# Patient Record
Sex: MALE | Race: ASIAN
[De-identification: names, ages, dates, MRNs, and addresses within clinical notes are randomized per-mention and may not be internally consistent; named-entity substitution may affect disease eponyms.]

---

## 2017-04-14 ENCOUNTER — HOSPITAL ENCOUNTER (OUTPATIENT)
Dept: HOSPITAL 74 - JASU-ENDO | Age: 62
Discharge: HOME | End: 2017-04-14
Attending: INTERNAL MEDICINE
Payer: COMMERCIAL

## 2017-04-14 VITALS — BODY MASS INDEX: 28.4 KG/M2

## 2017-04-14 VITALS — SYSTOLIC BLOOD PRESSURE: 109 MMHG | HEART RATE: 56 BPM | DIASTOLIC BLOOD PRESSURE: 55 MMHG

## 2017-04-14 VITALS — TEMPERATURE: 97.5 F

## 2017-04-14 DIAGNOSIS — D12.3: ICD-10-CM

## 2017-04-14 DIAGNOSIS — Z12.11: Primary | ICD-10-CM

## 2017-04-14 DIAGNOSIS — K57.30: ICD-10-CM

## 2017-04-14 PROCEDURE — 0DBL8ZX EXCISION OF TRANSVERSE COLON, VIA NATURAL OR ARTIFICIAL OPENING ENDOSCOPIC, DIAGNOSTIC: ICD-10-PCS | Performed by: INTERNAL MEDICINE

## 2018-06-27 ENCOUNTER — HOSPITAL ENCOUNTER (OUTPATIENT)
Dept: HOSPITAL 74 - JASU-ENDO | Age: 63
Discharge: HOME | End: 2018-06-27
Attending: INTERNAL MEDICINE
Payer: COMMERCIAL

## 2018-06-27 VITALS — SYSTOLIC BLOOD PRESSURE: 118 MMHG | HEART RATE: 57 BPM | DIASTOLIC BLOOD PRESSURE: 65 MMHG

## 2018-06-27 VITALS — BODY MASS INDEX: 28.4 KG/M2

## 2018-06-27 VITALS — TEMPERATURE: 98 F

## 2018-06-27 DIAGNOSIS — K29.40: ICD-10-CM

## 2018-06-27 DIAGNOSIS — K44.9: ICD-10-CM

## 2018-06-27 DIAGNOSIS — K22.2: ICD-10-CM

## 2018-06-27 DIAGNOSIS — K21.9: Primary | ICD-10-CM

## 2018-06-27 PROCEDURE — 0DB28ZX EXCISION OF MIDDLE ESOPHAGUS, VIA NATURAL OR ARTIFICIAL OPENING ENDOSCOPIC, DIAGNOSTIC: ICD-10-PCS | Performed by: INTERNAL MEDICINE

## 2018-06-27 PROCEDURE — 0DB98ZX EXCISION OF DUODENUM, VIA NATURAL OR ARTIFICIAL OPENING ENDOSCOPIC, DIAGNOSTIC: ICD-10-PCS | Performed by: INTERNAL MEDICINE

## 2018-06-27 PROCEDURE — 0DB48ZX EXCISION OF ESOPHAGOGASTRIC JUNCTION, VIA NATURAL OR ARTIFICIAL OPENING ENDOSCOPIC, DIAGNOSTIC: ICD-10-PCS | Performed by: INTERNAL MEDICINE

## 2018-06-27 PROCEDURE — 0DB38ZX EXCISION OF LOWER ESOPHAGUS, VIA NATURAL OR ARTIFICIAL OPENING ENDOSCOPIC, DIAGNOSTIC: ICD-10-PCS | Performed by: INTERNAL MEDICINE

## 2018-06-27 PROCEDURE — 0D748ZZ DILATION OF ESOPHAGOGASTRIC JUNCTION, VIA NATURAL OR ARTIFICIAL OPENING ENDOSCOPIC: ICD-10-PCS | Performed by: INTERNAL MEDICINE

## 2018-06-27 PROCEDURE — 0DB68ZX EXCISION OF STOMACH, VIA NATURAL OR ARTIFICIAL OPENING ENDOSCOPIC, DIAGNOSTIC: ICD-10-PCS | Performed by: INTERNAL MEDICINE

## 2018-06-28 NOTE — PATH
Surgical Pathology Report



Patient Name:  MARIAM CLARK

Accession #:  [id number]

[facility]. Rec. #:  [id number]                                                        

   /Age/Gender:  1955 (Age: 62) / M

Account:  [id number]                                                          

             Location: ASU-ENDOSCOPY

Taken:  2018

Received:  2018

Reported:  2018

Physicians:  Jaleel Castillo M.D.

  



Specimen(s) Received

A: BX 2ND PORTION DUODENUM AND BULB 

B: BX STOMACH 

C: BX SCHATZKI RING 

D: BX MID ESOPHAGUS 





Clinical History

GERD, dysphagia

Postoperative diagnosis: Schatzki's ring, hiatal hernia, GERD, atrophic

gastritis







Final Diagnosis

A. SECOND PORTION DUODENUM AND BULB, BIOPSY:

DUODENAL MUCOSA WITH ACTIVE CHRONIC DUODENITIS.



B. STOMACH, BIOPSY:

GASTRIC MUCOSA WITH ACTIVE CHRONIC GASTRITIS. 

IMMUNOSTAIN IS POSITIVE FOR H. PYLORI ORGANISMS. 



C. SCHATZKI'S RING, BIOPSY:

GASTROESOPHAGEAL JUNCTIONAL MUCOSA WITH ACTIVE CHRONIC INFLAMMATION, IN A

BACKGROUND OF REFLUX TYPE CHANGES.

NEGATIVE FOR INTESTINAL METAPLASIA.



D. MID ESOPHAGUS, BIOPSY:

ESOPHAGEAL (SQUAMOUS) MUCOSA WITH NO DIAGNOSTIC ABNORMALITIES.









***Electronically Signed***

Elena Moser M.D.





Gross Description

A.  Received in formalin, labeled "second portion of duodenum and bulb" are 3

tan, irregular portions of soft tissue ranging from 0.3-0.5 cm. in greatest

dimension. The specimens are submitted in toto in one cassette.



B.  Received in formalin, labeled "biopsy stomach" are 4 tan, irregular portions

of soft tissue ranging from 0.2-0.5 cm. in greatest dimension. The specimens are

submitted in toto in one cassette.



C.  Received in formalin, labeled "biopsy Schatzki's ring" are 4 tan, irregular

portions of soft tissue ranging from 0.3-0.5 cm. in greatest dimension. The

specimens are submitted in toto in one cassette.



D.  Received in formalin, labeled "biopsy midesophagus" are 2 tan, irregular

portions of soft tissue averaging 0.3 cm. in greatest dimension. The specimens

are submitted in toto in one cassette.

/2018



saudi

## 2018-10-11 ENCOUNTER — HOSPITAL ENCOUNTER (OUTPATIENT)
Dept: HOSPITAL 74 - JER | Age: 63
LOS: 2 days | Discharge: HOME | End: 2018-10-13
Payer: COMMERCIAL

## 2018-10-11 VITALS — BODY MASS INDEX: 27.8 KG/M2

## 2018-10-11 DIAGNOSIS — K35.80: Primary | ICD-10-CM

## 2018-10-11 DIAGNOSIS — K42.9: ICD-10-CM

## 2018-10-11 LAB
ALBUMIN SERPL-MCNC: 3.7 G/DL (ref 3.4–5)
ALP SERPL-CCNC: 114 U/L (ref 45–117)
ALT SERPL-CCNC: 57 U/L (ref 13–61)
ANION GAP SERPL CALC-SCNC: 7 MMOL/L (ref 8–16)
AST SERPL-CCNC: 25 U/L (ref 15–37)
BASOPHILS # BLD: 0.2 % (ref 0–2)
BILIRUB SERPL-MCNC: 0.4 MG/DL (ref 0.2–1)
BUN SERPL-MCNC: 16 MG/DL (ref 7–18)
CALCIUM SERPL-MCNC: 8.9 MG/DL (ref 8.5–10.1)
CHLORIDE SERPL-SCNC: 110 MMOL/L (ref 98–107)
CO2 SERPL-SCNC: 24 MMOL/L (ref 21–32)
CREAT SERPL-MCNC: 0.8 MG/DL (ref 0.55–1.3)
DEPRECATED RDW RBC AUTO: 13.3 % (ref 11.9–15.9)
EOSINOPHIL # BLD: 0.3 % (ref 0–4.5)
GLUCOSE SERPL-MCNC: 132 MG/DL (ref 74–106)
HCT VFR BLD CALC: 44.2 % (ref 35.4–49)
HGB BLD-MCNC: 14.6 GM/DL (ref 11.7–16.9)
LIPASE SERPL-CCNC: 138 U/L (ref 73–393)
LYMPHOCYTES # BLD: 6.7 % (ref 8–40)
MCH RBC QN AUTO: 30 PG (ref 25.7–33.7)
MCHC RBC AUTO-ENTMCNC: 33.1 G/DL (ref 32–35.9)
MCV RBC: 90.7 FL (ref 80–96)
MONOCYTES # BLD AUTO: 6.1 % (ref 3.8–10.2)
NEUTROPHILS # BLD: 86.7 % (ref 42.8–82.8)
PLATELET # BLD AUTO: 241 K/MM3 (ref 134–434)
PMV BLD: 7.8 FL (ref 7.5–11.1)
POTASSIUM SERPLBLD-SCNC: 4.3 MMOL/L (ref 3.5–5.1)
PROT SERPL-MCNC: 7.2 G/DL (ref 6.4–8.2)
RBC # BLD AUTO: 4.87 M/MM3 (ref 4–5.6)
SODIUM SERPL-SCNC: 141 MMOL/L (ref 136–145)
WBC # BLD AUTO: 14.6 K/MM3 (ref 4–10)

## 2018-10-11 NOTE — PDOC
Attending Attestation





- HPI


HPI: 





10/11/18 22:01


The patient is a 63 year old male with a significant past medical history of 

chronic abdominal pain who presents to the ED for abdominal pain for several 

days. Patient states he developed periumbilical abdominal pain last Friday (10/5

/18). He states his abdominal pain worsened yesterday and is now in his lower 

quadrant, right greater than left. Patient saw Dr. Frederick yesterday and had an 

US R/O appendicitis that was negative. Patient states he takes motrin for pain 

with slight relief. 


Denies fever or chills. Denies nausea, vomiting, or diarrhea. Denies dysuria or 

change in urinary output. Denies chest pain or shortness of breath. Denies any 

other symptoms. 








Documentation prepared by Rafaela Powell, acting as medical scribe for Judi Don DO





- Physicial Exam


PE: 





10/11/18 22:02





Constitutional: Awake, alert, oriented.  No acute distress.


Head:  Normocephalic.  Atraumatic


Eyes:  PERRL. EOMI.  Conjunctivae are not pale.


ENT:  Mucous membranes are moist and intact. Posterior pharynx without exudates 

or erythema. Uvula midline.


Neck:  Supple.  Full ROM. No lymphadenopathy.


Cardiovascular:  Regular rate.  Regular rhythm. S1, S2 regular.  Distal pulses 

are 2+ and symmetric.  


Pulmonary/Chest:  No evidence of respiratory distress.  Clear to auscultation 

bilaterally  No wheezing, rales or rhonchi.


Abdominal: + Diffusely tender.  Soft and non-distended.  No rebound, guarding 

or rigidity.  No organomegaly. No palpable masses. Good bowel sounds.


Back:  No CVA tenderness.


Musculoskeletal:  No edema.  No cyanosis.  No clubbing.  Full range of motion 

in all extremities.  Nocalf tenderness. Radial/pedal pulses are intact and 2+ 

bilaterally


Skin:  Skin is warm and dry.  No petechiae.  No purpura.  


Neurological:  Alert and oriented to person, place, and time.  Cranial nerves II

-XII are grossly intact. Normal speech. Strength is grossly symmetric. No 

sensory deficits.


Psychiatric:  Good eye contact.  Normal interaction, affect and behavior.











<Rafaela Powell - Last Filed: 10/11/18 22:01>





- Resident


Resident Name: Nadege Reyes





- ED Attending Attestation


I have performed the following: I have examined & evaluated the patient, The 

case was reviewed & discussed with the resident, I agree w/resident's findings 

& plan, Exceptions are as noted





- Medical Decision Making





10/11/18 22:00








I, Dr. Judi Don, DO, attest that this document has been prepared under 

my direction and personally reviewed by me in its entirety.   I further attest, 

that it accurately reflects all work, treatment, procedures and medical decision

-making performed by me.  


10/11/18 22:22


a/p: 62yo male with diffuse abd pain


-started in lower abd over the weekend, decreased po intake


-saw dr. frederick who performed labs and ultrasound as outpt


-still with nausea today and pain


-will repeat labs, will send for ct abd/pelvis 


-pt with mild diffuse ttp


-no rebound or guarding


-pt is nontoxic in appearance


-will medicate for nausea, will hydrate


-last BM was today


10/11/18 22:25


wbc from 6 to 14 


pelvic ultrasound negative for appy yesterday


10/11/18 23:39


pt with appendicitis on ct





10/11/18 23:39


case discussed with Dr. Lira who accepts pt to service - to [facility] for OR 

tomorrow





<Judi Don - Last Filed: 10/12/18 00:32>





**Heart Score/ECG Review





- ECG Intrepretation


Comment:: 





10/12/18 00:32


sinus at 63, L axis, nl interval, t wave inversions V4, t wave flattening 

diffusely





<Judi Don - Last Filed: 10/12/18 00:32>

## 2018-10-11 NOTE — PDOC
History of Present Illness





- General


Chief Complaint: Pain


Stated Complaint: PAIN, ACUTE


Time Seen by Provider: 10/11/18 20:32


History Source: Patient


Exam Limitations: No Limitations





- History of Present Illness


Initial Comments: 





10/11/18 22:07


Pt is a 62yo m with no significant PMH presenting to ED with complaints of 

abdominal pain. Pt said he has been having abdominal pain for about 1 week 

which started last Friday. Pain was periumbilical, more of a discomfort than 

pain and went away 2 days ago on Tuesday. He went to his PCP yesterday and had 

labs and abdominal ultrasound done which was negative for appendicitis. Pt 

states that the discomfort started again today and associated with nausea. He 

made himself vomit and he felt better. The pain is 3/10, described as a 

discomfort is now in his lower abdomen RLQ>LLQ. Denies fever, chest pain, sob, 

headache, diarrhea, blood in stool, urinary symptoms. He had an endoscopy done 

4 months ago in June. 





PCP: Otoniel


GE: Anna


PMH: none


PSH: none


Meds: none


Allergies: none


Social: occasional alcohol use





10/12/18 03:27


Family phone numbers:


Reynaldo (daughter) [phone]


Fang (wife) [phone]





Past History





- Past Medical History


Allergies/Adverse Reactions: 


 Allergies











Allergy/AdvReac Type Severity Reaction Status Date / Time


 


No Known Allergies Allergy   Verified 10/11/18 20:21











Home Medications: 


Ambulatory Orders





Aspirin [ASA -] 81 mg PO DAILY 04/13/17 


Multivitamin/Iron/Folic Acid [One Daily Multivitamin-Iron Tb] 1 each PO DAILY 04 /13/17 


Omega-3 Fatty Acids [Fish Oil] 300 mg PO DAILY 04/13/17 


Mag Carb/Aluminum Hydrox/Algin [Gaviscon Liquid] 15 - 30 ml PO Q6H PRN 06/26/18 


Pantoprazole Sodium [Protonix] 40 mg PO DAILY #1 tablet. 06/27/18 








Anemia: No


Asthma: No


Cancer: No


Cardiac Disorders: No


CVA: No


COPD: No


CHF: No


Dementia: No


Diabetes: No


GI Disorders: Yes (DIVERTICULOSIS, ADENOMA)


 Disorders: No


HTN: No


Hypercholesterolemia: No


Liver Disease: No


Seizures: No


Thyroid Disease: No





- Surgical History


Abdominal Surgery: No


Appendectomy: No


Cardiac Surgery: No


Cholecystectomy: No


Lung Surgery: No


Neurologic Surgery: No


Orthopedic Surgery: No





- Suicide/Smoking/Psychosocial Hx


Smoking History: Former smoker


Have you smoked in the past 12 months: No


Number of Cigarettes Smoked Daily: 2


If you are a former smoker, when did you quit?: 2017


Information on smoking cessation initiated: No


Hx Alcohol Use: Yes (OCCASIONAL)


Drug/Substance Use Hx: No


Substance Use Type: None


Hx Substance Use Treatment: No





**Review of Systems





- Review of Systems


Constitutional: No: Chills, Fever, Unexplained wgt Loss


HEENTM: No: Recent change in vision


Respiratory: No: Cough, Shortness of Breath


Cardiac (ROS): No: Chest Pain, Lightheadedness, Palpitations, Syncope


ABD/GI: Yes: See HPI, Nausea, Other (abdominal discomfort).  No: Abdominal 

Distended, Blood Streaked Bowels, Constipated, Diarrhea, Poor Appetite, Poor 

Fluid Intake, Rectal Bleeding, Vomiting, Abdominal cramping, Tarry Stools


: No: Burning, Dysuria, Hematuria


Musculoskeletal: No: Back Pain, Joint Pain, Muscle Weakness


Neurological: No: Headache, Numbness, Paresthesia, Tingling, Tremors





*Physical Exam





- Vital Signs


 Last Vital Signs











Temp Pulse Resp BP Pulse Ox


 


 98.7 F   91 H  18   152/78   98 


 


 10/11/18 20:18  10/11/18 20:18  10/11/18 20:18  10/11/18 20:18  10/11/18 20:18














- Physical Exam


Comments: 





10/12/18 06:45


pt sitting in bed comfortably with family at bedside


General Appearance: Yes: Nourished, Appropriately Dressed.  No: Apparent 

Distress


HEENT: positive: EOMI, PERCY, Normal Voice, Pharynx Normal, Hearing Grossly 

Normal.  negative: Pale Conjunctivae, Scleral Icterus (R), Scleral Icterus (L)


Neck: positive: Trachea midline, Supple.  negative: Carotid bruit, 

Lymphadenopathy (R), Lymphadenopathy (L)


Respiratory/Chest: positive: Lungs Clear, Normal Breath Sounds.  negative: 

Crackles, Rales, Rhonchi, Stridor, Wheezing


Cardiovascular: positive: Regular Rhythm, Regular Rate, S1, S2.  negative: Edema

, JVD, Murmur


Vascular Pulses: Carotid (R): 2+, Carotid (L): 2+, Dorsalis-Pedis (R): 2+, 

Doralis-Pedis (L): 2+


Gastrointestinal/Abdominal: positive: Normal Bowel Sounds, Soft.  negative: 

Distended, Guarding, Rebound, Tenderness (expressed discomfort when RLQ 

palpated however pt did not express pain)


Musculoskeletal: negative: CVA Tenderness


Extremity: positive: Normal Capillary Refill


Integumentary: positive: Normal Color, Dry, Warm


Neurologic: positive: CNs II-XII NML intact, Fully Oriented, Alert, Normal Mood/

Affect, Normal Response, Motor Strength 5/5





ED Treatment Course





- LABORATORY


CBC & Chemistry Diagram: 


 10/11/18 21:16





 10/11/18 21:16





- ADDITIONAL ORDERS


Additional order review: 


 Laboratory  Results











  10/11/18





  21:16


 


WBC  14.6 H


 


RBC  4.87


 


Hgb  14.6


 


Hct  44.2


 


MCV  90.7


 


MCH  30.0


 


MCHC  33.1


 


RDW  13.3


 


Plt Count  241


 


MPV  7.8


 


Absolute Neuts (auto)  12.7 H


 


Neutrophils %  86.7 H D


 


Lymphocytes %  6.7 L D


 


Monocytes %  6.1


 


Eosinophils %  0.3  D


 


Basophils %  0.2


 


Nucleated RBC %  0








 











  10/11/18





  21:16


 


RBC  4.87


 


MCV  90.7


 


MCHC  33.1


 


RDW  13.3


 


MPV  7.8


 


Neutrophils %  86.7 H D


 


Lymphocytes %  6.7 L D


 


Monocytes %  6.1


 


Eosinophils %  0.3  D


 


Basophils %  0.2














- RADIOLOGY


Radiology Studies Ordered: 














 Category Date Time Status


 


 ABDOMEN & PELVIS CT WITH CONTR [CT] Stat CT Scan  10/11/18 20:59 Ordered














- Medications


Given in the ED: 


ED Medications














Discontinued Medications














Generic Name Dose Route Start Last Admin





  Trade Name Freq  PRN Reason Stop Dose Admin


 


Sodium Chloride  1,000 mls @ 1,000 mls/hr  10/11/18 21:00  10/11/18 21:43





  Normal Saline -  IV  10/11/18 21:59  1,000 mls/hr





  ASDIR STA   Administration





     





     





     





     














Medical Decision Making





- Medical Decision Making


62yo m with no significant PMH presenting to ED with abdominal discomfort RLQ>

LLQ that started over the weekend. 


   Vitals: wnl


   PE: slight RLQ tenderness otherwise benign


DDx: low suspicion for appendicitis. Will order CT scan for abdominal 

pathology. Considering appendicitis, cholecystitis, colitis, gastritis, 

pancreatitis, aaa


-Orderd cbc, cmp, lipase, CT





10/11/18 22:06


spike in wbc 14. on 10/10 WBC 6.6. other labs wnl. UA negative





Received call from CT saying postive for appendicitis. Dr. oDn consulted 

Dr. Lira. Started pt on maintenance fluids. Ordered t+s, aptt, ekg.  NPO order


EKG nsr. Pt admitted to Dr. Lira and will be sent to satellite. 





Pt and family understood diagnosis and steps to follow.





*DC/Admit/Observation/Transfer


Diagnosis at time of Disposition: 


Appendicitis


Qualifiers:


 Appendicitis type: acute appendicitis Acute appendicitis type: unspecified 

acute appendicitis type Qualified Code(s): K35.80 - Unspecified acute 

appendicitis








- Discharge Dispostion


Decision to Admit order: Yes





- Referrals





- Patient Instructions





- Post Discharge Activity

## 2018-10-12 LAB
APPEARANCE UR: CLEAR
BILIRUB UR STRIP.AUTO-MCNC: NEGATIVE MG/DL
COLOR UR: (no result)
INR BLD: 0.99 (ref 0.83–1.09)
KETONES UR QL STRIP: NEGATIVE
LEUKOCYTE ESTERASE UR QL STRIP.AUTO: NEGATIVE
MAGNESIUM SERPL-MCNC: 2.3 MG/DL (ref 1.8–2.4)
NITRITE UR QL STRIP: NEGATIVE
PH UR: 6 [PH] (ref 5–8)
PHOSPHATE SERPL-MCNC: 3 MG/DL (ref 2.5–4.9)
PROT UR QL STRIP: NEGATIVE
PROT UR QL STRIP: NEGATIVE
PT PNL PPP: 11.7 SEC (ref 9.7–13)
SP GR UR: 1.02 (ref 1.01–1.03)
UROBILINOGEN UR STRIP-MCNC: NEGATIVE MG/DL (ref 0.2–1)

## 2018-10-12 PROCEDURE — 0DTJ4ZZ RESECTION OF APPENDIX, PERCUTANEOUS ENDOSCOPIC APPROACH: ICD-10-PCS

## 2018-10-12 PROCEDURE — 0WQF0ZZ REPAIR ABDOMINAL WALL, OPEN APPROACH: ICD-10-PCS

## 2018-10-12 RX ADMIN — ACETAMINOPHEN SCH: 325 TABLET ORAL at 21:00

## 2018-10-12 RX ADMIN — SODIUM CHLORIDE SCH MLS/HR: 9 INJECTION, SOLUTION INTRAVENOUS at 00:05

## 2018-10-12 RX ADMIN — SODIUM CHLORIDE SCH MLS/HR: 9 INJECTION, SOLUTION INTRAVENOUS at 09:50

## 2018-10-12 NOTE — OP
Operative Note





- Note:


Operative Date: 10/12/18


Pre-Operative Diagnosis: acute appendicitis, umbilical hernia


Operation: laparoscopic appendectomy, umbilical hernia repair


Findings: 





inflamed, enlarged appendix; cecum stuck to right sidewall; no sig fluid in 

pelvis; base of appendix had very short stump taken with second stapler load


Post-Operative Diagnosis: Same as Pre-op


Surgeon: Jesse Lira


Anesthesiologist/CRNA: Jodi Spencer (brenna/Luciano)


Anesthesia: General, Local (10ml 0.5% marcaine)


Specimens Removed: appendix to pathology


Estimated Blood Loss (mls): 5


Drains & Tubes with Location: Julio out at end of case


Drains, Volume Out (mls): 200 (UOP)


Fluid Volume Replaced (mls): 1,000 (crystalloid)


Operative Report Dictated: Yes

## 2018-10-12 NOTE — HP
Admitting History and Physical





- Primary Care Physician


PCP: Gill Frederick





- Admission


Chief Complaint: RLQ abdominal pain, nausea, anorexia


History of Present Illness: 





62yo [place] M with no significant PMH/PSH, began having upper/central abdominal 

pain last Friday, which moved to his lower abdomen, ultimately more on the 

right than the left, associated with anorexia, but no F/C or diarrhea. 

Initially some anorexia and constipation attributed to not eating much over the 

weekend. He called his PMD Monday and saw Dr. KAYCEE Frederick Tuesday, because the 

pain was still there, and was sent for bloodwork and US, which did not show 

much. Yesterday, he then developed nausea, and the pain increased, and he self-

induced vomiting once, which helped but did not eliminate the pain. He has had 

normal BMs last few days. He is not hungry. Took 400mg ibuprofen last weekend 

for pain, which was not helping much. No urinary complaints. He came to ER 

yesterday, where wbc was up to 14 from 6 earlier in week, and CT showed acute 

appendicitis without abscess or perforation. He was started on fluids and Zosyn

, and kept NPO. This morning, he has no pain without palpation, and only mild 

tenderness in RLQ. He is seen in his room with family at bedside. 


History Source: Patient


Limitations to Obtaining History: No Limitations





- Past Medical History


Gastrointestinal: Yes: GERD (takes prn PPI only)


Additional Past Medical History: 





denies





- Past Surgical History


Past Surgical History: Yes: None





- Smoking History


Smoking history: Current some day smoker


Have you smoked in the past 12 months: Yes


Aproximately how many cigarettes per day: 0 (last smoked ~2 wks ago)





- Alcohol/Substance Use


Hx Alcohol Use: Yes (OCCASIONAL)


History of Substance Use: reports: None





- Social History


Usual Living Arrangement: Yes: With Spouse


ADL: Independent


Occupation: maint/engineering at [employer]





Home Medications





- Allergies


Allergies/Adverse Reactions: 


 Allergies











Allergy/AdvReac Type Severity Reaction Status Date / Time


 


No Known Allergies Allergy   Verified 10/11/18 20:21














- Home Medications


Home Medications: 


Ambulatory Orders





Aspirin [ASA -] 81 mg PO Q2D 17 


Multivitamin/Iron/Folic Acid [One Daily Multivitamin-Iron Tb] 1 each PO DAILY  


Omega-3 Fatty Acids [Fish Oil] 300 mg PO DAILY 17 


Pantoprazole Sodium [Protonix] 40 mg PO DAILY #1 tablet. 18 








Home Medications (free text): uses Protonix prn only; ASA every other day





Family Disease History





- Family Disease History


Family Disease History: Diabetes: Father, Mother, CA: Sister (, colon CA

)





Review of Systems





- Review of Systems


Constitutional: reports: Loss of Appetite.  denies: Chills, Fever


Eyes: reports: Other (uses reading glasses).  denies: Recent Change in Vision


HENT: denies: Difficult Swallowing, Nasal Congestion, Throat Pain


Neck: denies: Swollen Glands, Tenderness


Cardiovascular: denies: Chest Pain, Palpitations


Respiratory: denies: Cough, SOB


Gastrointestinal: reports: Abdominal Pain (with hpi), Indigestion (occasional), 

Nausea (with hpi), Vomiting (self-induced once only).  denies: Constipation, 

Diarrhea


Genitourinary: denies: Burning, Dysuria


Musculoskeletal: denies: Back Pain, Joint Pain, Muscle Pain


Integumentary: denies: Change in Color, Rash


Neurological: denies: Dizziness, Headache, Unsteady Gait


Psychiatric: denies: Anxiety, Depression





Physical Examination


Vital Signs: 


 Vital Signs











Temperature  98.2 F   10/12/18 04:15


 


Pulse Rate  64   10/12/18 04:15


 


Respiratory Rate  20   10/12/18 04:15


 


Blood Pressure  147/80   10/12/18 04:15


 


O2 Sat by Pulse Oximetry (%)  100   10/12/18 04:15











Constitutional: Yes: Well Nourished, No Distress, Calm


Eyes: Yes: Conjunctiva Clear, EOM Intact


HENT: Yes: Atraumatic, Normocephalic


Neck: Yes: Supple, Trachea Midline


Cardiovascular: Yes: Regular Rate and Rhythm.  No: Murmur


Respiratory: Yes: Regular, CTA Bilaterally


Gastrointestinal: Yes: Soft, Hernia (small reducible umbilical), Hypoactive 

Bowel Sounds, Tenderness (mild RLQ to deep palpation only, even less LLQ).  No: 

Distention (protuberant), Tenderness, Epigastrium, Tenderness, Rebound


...Rectal Exam: Yes: Deferred


Renal/: No: CVA Tenderness - Left, CVA Tenderness - Right


Musculoskeletal: No: Joint Stiffness, Joint Swelling


Extremities: Yes: Other (small healing burn on dorsal finger).  No: Cool, 

Cyanosis


Edema: No


Peripheral Pulses WNL: Yes


Integumentary: No: Jaundice, Rash


Neurological: Yes: Alert, Oriented.  No: Unsteady Gait


Psychiatric: Yes: Alert, Oriented


Labs: 


 CBC, BMP





 10/11/18 21:16 





 10/11/18 21:16 





 CMP











Sodium  141 mmol/L (136-145)   10/11/18  21:16    


 


Potassium  4.3 mmol/L (3.5-5.1)   10/11/18  21:16    


 


Chloride  110 mmol/L ()  H  10/11/18  21:16    


 


Carbon Dioxide  24 mmol/L (21-32)   10/11/18  21:16    


 


Anion Gap  7 MMOL/L (8-16)  L  10/11/18  21:16    


 


BUN  16 mg/dL (7-18)   10/11/18  21:16    


 


Creatinine  0.8 mg/dL (0.55-1.3)   10/11/18  21:16    


 


Creat Clearance w eGFR  > 60  (>60)   10/11/18  21:16    


 


Random Glucose  132 mg/dL ()  H  10/11/18  21:16    


 


Calcium  8.9 mg/dL (8.5-10.1)   10/11/18  21:16    


 


Phosphorus  3.0 mg/dL (2.5-4.9)   10/12/18  00:37    


 


Magnesium  2.3 mg/dL (1.8-2.4)   10/12/18  00:37    


 


Total Bilirubin  0.4 mg/dL (0.2-1)   10/11/18  21:16    


 


AST  25 U/L (15-37)   10/11/18  21:16    


 


ALT  57 U/L (13-61)   10/11/18  21:16    


 


Alkaline Phosphatase  114 U/L ()   10/11/18  21:16    


 


Total Protein  7.2 g/dl (6.4-8.2)   10/11/18  21:16    


 


Albumin  3.7 g/dl (3.4-5.0)   10/11/18  21:16    


 


Lipase  138 U/L ()   10/11/18  21:16    








 INR, PTT











INR  0.99  (0.83-1.09)   10/12/18  00:37    








 Urine Test Results











Urine Color  Straw   10/12/18  02:30    


 


Urine Appearance  Clear   10/12/18  02:30    


 


Urine pH  6.0  (5.0-8.0)   10/12/18  02:30    


 


Ur Specific Gravity  1.024  (1.010-1.035)   10/12/18  02:30    


 


Urine Protein  Negative  (NEGATIVE)   10/12/18  02:30    


 


Urine Glucose (UA)  Negative  (NEGATIVE)   10/12/18  02:30    


 


Urine Ketones  Negative  (NEGATIVE)   10/12/18  02:30    


 


Urine Blood  Negative  (NEGATIVE)   10/12/18  02:30    


 


Urine Nitrite  Negative  (NEGATIVE)   10/12/18  02:30    


 


Urine Bilirubin  Negative  (<2.0 mg/dL)   10/12/18  02:30    


 


Ur Leukocyte Esterase  Negative  (NEGATIVE)   10/12/18  02:30    














Imaging





- Results


Chest X-ray: Report Reviewed (no acute process), Image Reviewed


Cat Scan: Report Reviewed, Image Reviewed (images personally reviewed - 

appendix dilated, hyperemic, mild inflammatory changes; no free fluid or air, 

no obstruction)





Problem List





- Problems


(1) Acute appendicitis without peritonitis


Assessment/Plan: 


admit [facility] to surgery


NPO/IVF until postop


IV antibiotics


pain meds prn - nonnarcotics first line


GI/DVT prophylaxis


Discussed with patient risks, benefits and alternatives of laparoscopic 

possible open appendectomy, including but not limited to bleeding, infection, 

injury to adjacent structures, intestinal leak or injury, intraabdominal abscess

, incisional hernia, need for further procedures; alternatives include 

antibiotics, delayed or no surgery - risks of this include failure of 

nonoperative therapy, perforation, sepsis, recurrence.


Patient desires to proceed with operation - will take to OR for above. Informed 

consent signed for same.


Code(s): K35.80 - UNSPECIFIED ACUTE APPENDICITIS   





(2) RLQ abdominal pain


Code(s): R10.31 - RIGHT LOWER QUADRANT PAIN   





(3) Nausea alone


Code(s): R11.0 - NAUSEA   





(4) Anorexia


Code(s): R63.0 - ANOREXIA

## 2018-10-12 NOTE — EKG
Test Reason : 

Blood Pressure : ***/*** mmHG

Vent. Rate : 063 BPM     Atrial Rate : 063 BPM

   P-R Int : 142 ms          QRS Dur : 104 ms

    QT Int : 410 ms       P-R-T Axes : 065 -43 029 degrees

   QTc Int : 419 ms

 

NORMAL SINUS RHYTHM

LEFT AXIS DEVIATION

NONSPECIFIC T WAVE ABNORMALITY

ABNORMAL ECG

Confirmed by VERONICA OSMAN MD ([id number]) on 10/12/2018 9:41:02 AM

 

Referred By:             Confirmed By:VERONICA OSMAN MD

## 2018-10-13 VITALS — TEMPERATURE: 98.4 F | HEART RATE: 69 BPM | SYSTOLIC BLOOD PRESSURE: 130 MMHG | DIASTOLIC BLOOD PRESSURE: 66 MMHG

## 2018-10-13 RX ADMIN — ACETAMINOPHEN SCH MG: 325 TABLET ORAL at 09:49

## 2018-10-13 RX ADMIN — IBUPROFEN SCH MG: 600 TABLET, FILM COATED ORAL at 00:17

## 2018-10-13 RX ADMIN — IBUPROFEN SCH MG: 600 TABLET, FILM COATED ORAL at 11:57

## 2018-10-13 RX ADMIN — ACETAMINOPHEN SCH MG: 325 TABLET ORAL at 02:44

## 2018-10-13 RX ADMIN — ACETAMINOPHEN SCH MG: 325 TABLET ORAL at 16:30

## 2018-10-13 RX ADMIN — IBUPROFEN SCH MG: 600 TABLET, FILM COATED ORAL at 06:53

## 2018-10-13 NOTE — DS
Physical Examination


Vital Signs: 


 Vital Signs











Temperature  98.4 F   10/13/18 10:00


 


Pulse Rate  69   10/13/18 10:00


 


Respiratory Rate  18   10/13/18 10:00


 


Blood Pressure  130/66   10/13/18 10:00


 


O2 Sat by Pulse Oximetry (%)  98   10/12/18 21:00











Findings/Remarks: 





Seen and examined in bed. Pt feeling well, has been up, ambulating, tolerating 

diet, pain controlled with po nonnarcotics. Voided but no BM yet. No 

complaints. No fevers or nausea.


Constitutional: Yes: Well Nourished, No Distress, Calm


Eyes: Yes: Conjunctiva Clear, EOM Intact


HENT: Yes: Atraumatic, Normocephalic


Cardiovascular: Yes: Regular Rate and Rhythm


Respiratory: Yes: Regular, CTA Bilaterally


Gastrointestinal: Yes: Normal Bowel Sounds, Soft, Distention (protuberant/some)

, Tenderness (minimal incisional).  No: Tenderness, Epigastrium, Tenderness, 

Rebound


...Rectal Exam: Yes: Deferred


Musculoskeletal: No: Joint Stiffness, Joint Swelling


Extremities: No: Cool, Cyanosis


Integumentary: Yes: Incision (x3 dressed).  No: Jaundice, Rash


Wound/Incision: Yes: Steri Strips (under dressings), Dressing Dry and Intact (x3

).  No: Dressing Removed


Neurological: Yes: Alert, Oriented


Psychiatric: Yes: Alert, Oriented


Labs: 


 no new labs








Discharge Summary


Reason For Visit: ACUTE APPENDICITIS


Current Active Problems





Umbilical hernia without obstruction or gangrene (Acute)


Acute appendicitis without peritonitis (Acute)


RLQ abdominal pain (Acute)


Nausea alone (Acute)


Anorexia (Acute)








Procedures: Principal: laparoscopic appendectomy with umbilical hernia repair


Hospital Course: 





63yoM with no sig PMH/PSH presented with 1 wk of central and then lower 

abdominal pain, associated with anorexia, brief nausea, and no fever. He had 

seen PMD earlier in the week and had normal labwork and US showing little fluid

, but the pain persisted and got worse. In the ER, he had wbc 14, and CT showed 

appendicitis without abscess or perforation. On exam, he also had a small 

reducible umbilical hernia. He was taken to the OR for uneventful laparoscopic 

appendectomy and primary umbilical hernia repair. Postoperative course has been 

unremarkable, with progressive tolerance of diet, ambulation, and voiding. Pain 

has been well-controlled with oral Tylenol and Ibuprofen. Dressings are clean, 

dry and intact. He is discharged home with lifting restrictions, to follow up 

in surgical clinic in about 2-3 weeks, and with his PMD. 





Time spent on discharge: 35 minutes


Condition: Good





- Instructions


Diet, Activity, Other Instructions: 


Postoperative instructions: You had a laparoscopic appendectomy with umbilical 

hernia repair on 10/12/18 by Dr. Jesse Lira of [facility].


 


Activity: Resume your usual activities gradually, but no heavy exertion or 

lifting more than 10-15 pounds for 1 month. Remove dressings 48 hours after 

surgery; sticky tapes underneath will fall off by themselves. You may shower 

daily starting then, just pat the incision areas dry. No bath or swimming until 

skin incisions have healed. Eat lightly at first, but advance to your usual 

diet as tolerated.


 


Pain: For pain, you may use and alternate Tylenol (acetaminophen) 1-2 pills and/

or ibuprofen 200 mg (1-3 pills) every 6 hours each as needed; this means that 

you can take one OR the other at 3-hour intervals. Decrease what you take as 

your pain gets better. Do not take more than 4000mg of acetaminophen in a day. 

Take medications as prescribed or indicated on the labeling.


 


Follow-up: Call Dr. Lira's office at [phone] to make your postop 

appointment (Wednesday in approximately 2 weeks after surgery). Clinic is held 

in the [facility] on the first floor of [facility].


 


Call the office if you have:


* increasing pain not responsive to pain medication


* fever of 101F or higher


* vomiting


* unusual or increasing bleeding or drainage from wounds


* increasing redness or swelling at wound sites


* inability to urinate





Also, see your primary medical doctor within 1-2 weeks.


Referrals: 


Gill Frederick MD [Primary Care Provider] - 


Disposition: HOME





- Home Medications


Comprehensive Discharge Medication List: 


Ambulatory Orders





Aspirin [ASA -] 81 mg PO Q2D 04/13/17 


Multivitamin/Iron/Folic Acid [One Daily Multivitamin-Iron Tb] 1 each PO DAILY 04 /13/17 


Omega-3 Fatty Acids [Fish Oil] 300 mg PO DAILY 04/13/17 


Pantoprazole Sodium [Protonix] 40 mg PO DAILY #1 tablet. 06/27/18 


Acetaminophen [Tylenol .Regular Strength -] 650 mg PO Q6H  tablet 10/13/18 


Ibuprofen [Motrin -] 600 mg PO Q6H  tablet 10/13/18

## 2018-10-17 NOTE — PATH
Surgical Pathology Report



Patient Name:  MARIAM CLARK

Accession #:  [id number]

[facility]. Rec. #:  [id number]                                                        

   /Age/Gender:  1955 (Age: 63) / M

Account:  [id number]                                                          

             Location: AMBULATORY SURG

Taken:  10/12/2018

Received:  10/15/2018

Reported:  10/17/2018

Physicians:  Jesse Lira M.D.

PHYSICIAN EMERGENCY DEPT

  



Specimen(s) Received

 APPENDIX 





Clinical History

Acute appendicitis, umbilical hernia







Final Diagnosis

APPENDIX, LAPAROSCOPIC APPENDECTOMY:

MILD ACUTE APPENDICITIS.





***Electronically Signed***

Gissell Mcneal M.D.





Gross Description

Received in formalin, labeled "appendix," is a 7 x 1.5 x 1.5 cm. in length

vermiform appendix with a stapled margin of resection and moderate attached fat.

The serosa is pink-tan and smooth. Sectioning reveals an impacted fecalith and

focal area of hemorrhage. The wall of the appendix averages 0.2 cm. in

thickness. Representative sections are submitted in one cassette. 

EDDIE/10/15/2018



rj/10/15/2018

## 2019-01-01 NOTE — OP
DATE OF OPERATION:  10/12/18

 

PREOPERATIVE DIAGNOSIS:  Acute appendicitis and umbilical hernia.

 

POSTOPERATIVE DIAGNOSIS:  Acute appendicitis and umbilical hernia.

 

PROCEDURE:  Laparoscopic appendectomy and primary umbilical hernia repair.

 

SURGEON:  Jesse Lira MD. 

 

ANESTHESIA:  General endotracheal and local (10 mL of 0.5% Marcaine).

 

ESTIMATED BLOOD LOSS:  5 mL.

 

FLUIDS:  1 L of crystalloid.

 

URINE OUTPUT:  200 mL.

 

SPECIMENS:  Appendix to pathology.  

 

FINDINGS:  An inflamed enlarged appendix with cecum stuck to the right sidewall 
with

no significant fluid in the pelvis.  The base of the appendix was taken with a 
second

staple load for a very short stump.  The umbilical hernia was closed primarily 
on the

way out as the hernia was used for the camera port.  

 

DISPOSITION:  Stable and extubated to PACU.

 

INDICATIONS FOR PROCEDURE:  The patient is a 63-year-old [place] male with no

significant past medical or surgical history who presented with central 
abdominal

pain moving to his lower abdomen more on the right than the left, associated 
with

anorexia and some constipation.  He had seen his primary care doctor and was 
sent for

blood work and an ultrasound, which did not show much.  He subsequently 
developed nausea and

increased pain and then came to the emergency room, where his white count was 
elevated

from earlier in the week to 14,000, and a CT was done showing acute appendicitis

without abscess or perforation.  He was started on IV fluids and Zosyn and kept 
NPO. 

On physical examination, the patient was also noted to have an umbilical 
hernia. 

Risks, benefits and alternatives of laparoscopic possible open appendectomy with

umbilical hernia repair were discussed with the patient, including but not 
limited to

bleeding, infection, injury to adjacent structures, intestinal leak or injury,

intraabdominal abscess, incisional hernia, need for further procedures, and

alternatives inclusive of antibiotics and delayed or no surgery, with attendant 
risks

of failure of nonoperative therapy, appendiceal perforation, sepsis, and 
recurrence. 

The patient desired to proceed with the operation and signed an informed 
consent for

the same, for which he is now brought to the operating room.

 

OPERATIVE TECHNIQUE:  The patient was brought to the operating room and laid 
supine

on the operating table.  Sequential compression devices were applied to

bilateral lower extremities, and preoperative antibiotics were continued 
appropriately

in the perioperative period.  After induction and intubation by anesthesia, his

abdomen was clipped of hair and prepped and draped in a sterile fashion, after 
a Julio

catheter was placed in his bladder in sterile fashion, which was removed at the 
end

of the case.  A small umbilical incision was made with a scalpel and carried 
into the

subcutaneous tissues with electrocautery, until the hernia sac was identified 
as well

as the abdominal wall fascia.  The hernia sac was carefully  somewhat 
from

the edges of the fascia and the edge of the fascia grasped with Kocher clamps 
and

elevated.  The hernia sac was entered and the fatty content of the hernia 
reduced. 

The peritoneum was entered then through the hernia sac, and a fingertip used to 
ensure

entry into the abdominal cavity and the absence of any underlying adhesions. A

stay suture of 0 Vicryl in figure-of-eight fashion was placed in the fascia for 
closure

on the way out.  The Thompson trocar was then introduced directly through the 
opening

into the abdominal cavity and secured in place with the balloon.  The abdomen 
was

insufflated with carbon dioxide.  The patient was placed in Trendelenburg 
position

with the right side plane somewhat upward, and the laparoscope was inserted to 
inspect the

abdominal cavity.  Two additional 5 mm ports were placed under direct vision in 
the

suprapubic and left lower quadrant areas, and the camera was moved to the left

lower quadrant port.  Instruments were introduced through the other 2 ports, and

graspers were used to manipulate the small bowel medially in a gentle fashion to

reveal the appendix in the right lower quadrant.  There was no significant fluid

identified in the pelvis prior to appendiceal dissection.  The appendix was 
noted to

be enlarged and inflamed, coming off the cecum which was somewhat stuck to the

right sidewall.  The appendix was grasped in such a way as to be able to use a

[place] dissector to create a window in the mesentery at the base of the 
appendix

where it joined the cecum, and the base of the appendix was initially 
transected with

a purple load of the Endo HARVEY stapler.  The mesoappendix was then identified by 
elevating

the appendix again with the grasper, and a white load of the Endo HARVEY stapler 
used to

transect the mesoappendix, taking care to avoid the veil of Treves and terminal 
ileum.

 Once the appendix had been completely transected, it was noted that there was 
a very

short, angled stump of appendix left at the cecal cuff.  This was carefully 
grasped

with one of the graspers from the left, and an additional load of the Endo HARVEY 
purple

stapler utilized to transect just a tiny additional portion of the appendix 
taking

the staple line back flush with the cecum.  Both the appendix and the cecal 
cuff were

placed in an EndoCatch bag and retrieved out the umbilical port site.  The

operative field was inspected for hemostasis, which was noted to be complete,

particularly the staple lines; and suction was used to remove any fluid and 
blood from

the operative field, but irrigation was not undertaken.  Once everything had 
been

removed, the 5 mm ports were also removed under direct vision, and the Thompson 
trocar

removed from the umbilical site, and the abdomen exsufflated of carbon dioxide.
  The

umbilical hernia site itself was inspected to ensure that the fat and hernia 
sac were

reduced entirely, and the stay suture in the fascia at that site was tied to 
close the

fascial defect, accomplishing a primary repair of the umbilical hernia.  
Hemostasis was 

achieved at the port sites with electrocautery where necessary.  The skin was 
then 

closed with 4-0 Vicryl subcuticular sutures, including a running at the 
umbilicus.  

Benzoin and Steri-Strips were applied to each incision, and dressings of gauze 

and Tegaderm were placed over these.  The Julio catheter was removed from the 
patient's bladder.

 

Counts were correct at the end of the procedure.  The patient was then awakened 
and

extubated by Anesthesia, moved to a stretcher, and taken back to the recovery 
room in

stable condition, having tolerated the procedure well.  

 

 

Jesse Lira M.D.

 

[id number]

DD: 01/01/2019 14:54

DT: 01/01/2019 15:46

Job #:  [id number]

MTDD

## 2019-06-03 ENCOUNTER — HOSPITAL ENCOUNTER (EMERGENCY)
Dept: HOSPITAL 74 - JER | Age: 64
Discharge: HOME | End: 2019-06-03
Payer: COMMERCIAL

## 2019-06-24 PROBLEM — Z00.00 ENCOUNTER FOR PREVENTIVE HEALTH EXAMINATION: Status: ACTIVE | Noted: 2019-06-24

## 2019-06-25 ENCOUNTER — APPOINTMENT (OUTPATIENT)
Dept: UROLOGY | Facility: CLINIC | Age: 64
End: 2019-06-25
Payer: COMMERCIAL

## 2019-06-25 VITALS
HEART RATE: 63 BPM | BODY MASS INDEX: 27.15 KG/M2 | SYSTOLIC BLOOD PRESSURE: 123 MMHG | DIASTOLIC BLOOD PRESSURE: 69 MMHG | WEIGHT: 173 LBS | HEIGHT: 67 IN | TEMPERATURE: 98.3 F

## 2019-06-25 DIAGNOSIS — F17.200 NICOTINE DEPENDENCE, UNSPECIFIED, UNCOMPLICATED: ICD-10-CM

## 2019-06-25 DIAGNOSIS — R30.0 DYSURIA: ICD-10-CM

## 2019-06-25 DIAGNOSIS — Z80.0 FAMILY HISTORY OF MALIGNANT NEOPLASM OF DIGESTIVE ORGANS: ICD-10-CM

## 2019-06-25 DIAGNOSIS — Z83.3 FAMILY HISTORY OF DIABETES MELLITUS: ICD-10-CM

## 2019-06-25 DIAGNOSIS — Z78.9 OTHER SPECIFIED HEALTH STATUS: ICD-10-CM

## 2019-06-25 DIAGNOSIS — Z87.438 PERSONAL HISTORY OF OTHER DISEASES OF MALE GENITAL ORGANS: ICD-10-CM

## 2019-06-25 PROCEDURE — 99204 OFFICE O/P NEW MOD 45 MIN: CPT | Mod: 25

## 2019-06-25 PROCEDURE — 51798 US URINE CAPACITY MEASURE: CPT

## 2019-06-25 RX ORDER — TAMSULOSIN HCL 0.4 MG
0.4 CAPSULE ORAL
Refills: 0 | Status: ACTIVE | COMMUNITY

## 2019-06-25 RX ORDER — FINASTERIDE 5 MG/1
5 TABLET, FILM COATED ORAL
Refills: 0 | Status: ACTIVE | COMMUNITY

## 2019-06-26 PROBLEM — Z87.438 HISTORY OF BPH: Status: RESOLVED | Noted: 2019-06-25 | Resolved: 2019-06-26

## 2019-06-26 PROBLEM — F17.200 CURRENT SMOKER ON SOME DAYS: Status: ACTIVE | Noted: 2019-06-25

## 2019-06-26 PROBLEM — Z80.0 FAMILY HISTORY OF COLON CANCER: Status: ACTIVE | Noted: 2019-06-25

## 2019-06-26 PROBLEM — Z83.3 FAMILY HISTORY OF DIABETES MELLITUS: Status: ACTIVE | Noted: 2019-06-25

## 2019-06-26 PROBLEM — Z78.9 SOCIAL ALCOHOL USE: Status: ACTIVE | Noted: 2019-06-25

## 2019-06-27 LAB
APPEARANCE: ABNORMAL
BACTERIA: ABNORMAL
BILIRUBIN URINE: NEGATIVE
BLOOD URINE: NORMAL
COLOR: YELLOW
GLUCOSE QUALITATIVE U: NEGATIVE
HYALINE CASTS: 26 /LPF
KETONES URINE: NEGATIVE
LEUKOCYTE ESTERASE URINE: ABNORMAL
MICROSCOPIC-UA: NORMAL
NITRITE URINE: NEGATIVE
PH URINE: 6
PROTEIN URINE: ABNORMAL
RED BLOOD CELLS URINE: 3 /HPF
SPECIFIC GRAVITY URINE: 1.01
SQUAMOUS EPITHELIAL CELLS: 1 /HPF
UROBILINOGEN URINE: NORMAL
WHITE BLOOD CELLS URINE: 141 /HPF

## 2019-06-27 NOTE — HISTORY OF PRESENT ILLNESS
[FreeTextEntry1] : Patient referred for evalaution and management of issues following a prostate needle biopsy.\par He had an elevated PSA 5.8 and underwent a 12 core biopsy which was negative for cancer but did note some cores with chronic inflammation. He did have some hematuria post biopsy and @two weeks later went into retention. He never had a fever but was treated with 2 weeks keflex in addition to finasteride and flomax. At baseline denies any LUts but at present still having feeling of not emptying, frequency, nocturia times 2 and a slow stream with pushing. Some burning from time to time. he also notes loss of ejaculation. \par no prior UTIs or retention. \par \par PVR 0.\par

## 2019-06-27 NOTE — PHYSICAL EXAM
[General Appearance - Well Developed] : well developed [General Appearance - Well Nourished] : well nourished [Edema] : no peripheral edema [Exaggerated Use Of Accessory Muscles For Inspiration] : no accessory muscle use [Abdomen Soft] : soft [Abdomen Tenderness] : non-tender [Abdomen Mass (___ Cm)] : no abdominal mass palpated [Abdomen Hernia] : no hernia was discovered [Deferred] : exam was deferred [No Lesions] : no lesions [Circumcised] : the penis was circumcised [Normal] : normal [Testes] : normal [Epididymis] : was normal [Vas Deferens / Spermatic Cord] : was normal [Normal Station and Gait] : the gait and station were normal for the patient's age [] : no rash [Oriented To Time, Place, And Person] : oriented to person, place, and time [No Focal Deficits] : no focal deficits [Inguinal Lymph Nodes Enlarged Bilaterally] : inguinal [Scrotum Hydrocele On The Right] : no hydrocele [Scrotum Hydrocele On The Left] : no hydrocele

## 2019-06-28 LAB — BACTERIA UR CULT: ABNORMAL

## 2019-06-28 RX ORDER — SULFAMETHOXAZOLE AND TRIMETHOPRIM 800; 160 MG/1; MG/1
800-160 TABLET ORAL TWICE DAILY
Qty: 28 | Refills: 0 | Status: ACTIVE | COMMUNITY
Start: 2019-06-28 | End: 1900-01-01

## 2019-08-02 ENCOUNTER — APPOINTMENT (OUTPATIENT)
Dept: UROLOGY | Facility: CLINIC | Age: 64
End: 2019-08-02
Payer: COMMERCIAL

## 2019-08-02 PROCEDURE — 99213 OFFICE O/P EST LOW 20 MIN: CPT

## 2019-08-02 NOTE — HISTORY OF PRESENT ILLNESS
[FreeTextEntry1] : Patient referred for evalaution and management of issues following a prostate needle biopsy.\par He had an elevated PSA 5.8 and underwent a 12 core biopsy which was negative for cancer but did note some cores with chronic inflammation. He did have some hematuria post biopsy and @two weeks later went into retention. He never had a fever but was treated with 2 weeks keflex in addition to finasteride and flomax. At baseline denies any LUts but at present still having feeling of not emptying, frequency, nocturia times 2 and a slow stream with pushing. Some burning from time to time. he also notes loss of ejaculation.  no prior UTIs or retention. \par Had UTI - document by culture - treated and at time also had him stop the finasteride. Notes no difference in voiding on Flomax now versus basleine - does not no ejaculate.\par \par \par

## 2019-08-02 NOTE — ASSESSMENT
[FreeTextEntry1] : check culture to ensure no UTI\par Ok to stop Flomax - expect ejaculation to return.

## 2019-08-05 LAB — BACTERIA UR CULT: NORMAL

## 2020-02-07 ENCOUNTER — APPOINTMENT (OUTPATIENT)
Dept: UROLOGY | Facility: CLINIC | Age: 65
End: 2020-02-07
Payer: COMMERCIAL

## 2020-02-07 VITALS
DIASTOLIC BLOOD PRESSURE: 80 MMHG | HEIGHT: 67 IN | SYSTOLIC BLOOD PRESSURE: 150 MMHG | BODY MASS INDEX: 28.09 KG/M2 | TEMPERATURE: 98 F | WEIGHT: 179 LBS | HEART RATE: 65 BPM

## 2020-02-07 PROCEDURE — 99213 OFFICE O/P EST LOW 20 MIN: CPT

## 2020-02-07 NOTE — PHYSICAL EXAM
[General Appearance - Well Developed] : well developed [General Appearance - Well Nourished] : well nourished [Abdomen Soft] : soft [Abdomen Tenderness] : non-tender [Abdomen Hernia] : no hernia was discovered [Rectal Exam - Rectum] : digital rectal exam was normal [No Prostate Nodules] : no prostate nodules [Prostate Size ___ gm] : prostate size [unfilled] gm [] : no respiratory distress [Edema] : no peripheral edema [Exaggerated Use Of Accessory Muscles For Inspiration] : no accessory muscle use [Oriented To Time, Place, And Person] : oriented to person, place, and time [Normal Station and Gait] : the gait and station were normal for the patient's age [No Focal Deficits] : no focal deficits

## 2020-02-07 NOTE — HISTORY OF PRESENT ILLNESS
[FreeTextEntry1] : Patient referred for evalaution and management of issues following a prostate needle biopsy.\par He had an elevated PSA 5.8 and underwent a 12 core biopsy which was negative for cancer but did note some cores with chronic inflammation. He did have some hematuria post biopsy and @two weeks later went into retention. He never had a fever but was treated with 2 weeks keflex in addition to finasteride and flomax. At baseline denies any LUts but at present still having feeling of not emptying, frequency, nocturia times 2 and a slow stream with pushing. Some burning from time to time. he also notes loss of ejaculation.  no prior UTIs or retention. \par Had UTI - document by culture - treated and at time also had him stop the finasteride. Notes no difference in voiding on Flomax now versus baseline - does not no ejaculate.\par \par OK off medications - occasional nocturia but no other bothersome symptoms. Some reduction id ejaculate and some degree premature. No UTI symptoms. \par \par \par

## 2020-02-19 LAB
PSA FREE FLD-MCNC: 13 %
PSA FREE SERPL-MCNC: 0.64 NG/ML
PSA SERPL-MCNC: 4.79 NG/ML

## 2020-09-30 ENCOUNTER — HOSPITAL ENCOUNTER (OUTPATIENT)
Dept: HOSPITAL 74 - JASU-ENDO | Age: 65
Discharge: HOME | End: 2020-09-30
Attending: INTERNAL MEDICINE
Payer: COMMERCIAL

## 2020-09-30 VITALS — DIASTOLIC BLOOD PRESSURE: 81 MMHG | SYSTOLIC BLOOD PRESSURE: 143 MMHG

## 2020-09-30 VITALS — HEART RATE: 61 BPM

## 2020-09-30 VITALS — BODY MASS INDEX: 28.5 KG/M2

## 2020-09-30 VITALS — TEMPERATURE: 97.2 F

## 2020-09-30 DIAGNOSIS — K62.1: ICD-10-CM

## 2020-09-30 DIAGNOSIS — Z12.11: Primary | ICD-10-CM

## 2020-09-30 DIAGNOSIS — K64.8: ICD-10-CM

## 2020-09-30 DIAGNOSIS — K57.30: ICD-10-CM

## 2020-09-30 PROCEDURE — 0DBP8ZX EXCISION OF RECTUM, VIA NATURAL OR ARTIFICIAL OPENING ENDOSCOPIC, DIAGNOSTIC: ICD-10-PCS | Performed by: INTERNAL MEDICINE

## 2020-10-01 NOTE — PATH
Surgical Pathology Report



Patient Name:  MARIAM CLARK

Accession #:  [id number]

[facility]. Rec. #:  [id number]                                                        

   /Age/Gender:  1955 (Age: 65) / M

Account:  [id number]                                                          

             Location: ASU-ENDOSCOPY

Taken:  2020

Received:  2020

Reported:  10/1/2020

Physicians:  Jaleel Castillo M.D.

  



Specimen(s) Received

 RECTAL POLYP 





Clinical History

History of adenomatous polyps

Postoperative diagnosis: Rectal polyps, diverticulosis







Final Diagnosis

RECTAL POLYPS, BIOPSY:

HYPERPLASTIC POLYP.

POLYPOID COLONIC MUCOSA WITH SMALL LYMPHOID AGGREGATE.





***Electronically Signed***

Gissell Mcneal M.D.





Gross Description

Received in formalin, labeled "rectal polyps biopsy" are 4 tan, irregular

portions of soft tissue ranging from 0.1-0.2 cm. in greatest dimension. The

specimens are submitted in toto in one cassette.

/2020



saudi/2020

## 2020-10-23 ENCOUNTER — APPOINTMENT (OUTPATIENT)
Dept: UROLOGY | Facility: CLINIC | Age: 65
End: 2020-10-23
Payer: COMMERCIAL

## 2020-10-23 VITALS — TEMPERATURE: 97.8 F

## 2020-10-23 DIAGNOSIS — R97.20 ELEVATED PROSTATE, SPECIFIC ANTIGEN [PSA]: ICD-10-CM

## 2020-10-23 PROCEDURE — 99072 ADDL SUPL MATRL&STAF TM PHE: CPT

## 2020-10-23 PROCEDURE — 99212 OFFICE O/P EST SF 10 MIN: CPT

## 2020-10-23 NOTE — HISTORY OF PRESENT ILLNESS
[FreeTextEntry1] : Patient referred for evalaution and management of issues following a prostate needle biopsy.\par He had an elevated PSA 5.8 and underwent a 12 core biopsy which was negative for cancer but did note some cores with chronic inflammation. He did have some hematuria post biopsy and @two weeks later went into retention. He never had a fever but was treated with 2 weeks keflex in addition to finasteride and flomax. At baseline denies any LUts but at present still having feeling of not emptying, frequency, nocturia times 2 and a slow stream with pushing. Some burning from time to time. he also notes loss of ejaculation.  no prior UTIs or retention. \par Had UTI - document by culture - treated and at time also had him stop the finasteride. Notes no difference in voiding on Flomax now versus baseline - does not no ejaculate.\par OK off medications - occasional nocturia but no other bothersome symptoms. Some reduction id ejaculate and some degree premature. No UTI symptoms. \par doing well - still of medication with no LUTs as seen below \par \par \par  [Urinary Urgency] : urinary urgency [Nocturia] : nocturia [Straining] : no straining [Weak Stream] : no weak stream [Intermittency] : no intermittency [Hematuria - Gross] : no gross hematuria

## 2020-10-25 LAB
PSA FREE FLD-MCNC: 16 %
PSA FREE SERPL-MCNC: 0.63 NG/ML
PSA SERPL-MCNC: 4.01 NG/ML

## 2020-10-28 ENCOUNTER — NON-APPOINTMENT (OUTPATIENT)
Age: 65
End: 2020-10-28

## 2022-04-01 ENCOUNTER — HOSPITAL ENCOUNTER (EMERGENCY)
Dept: HOSPITAL 74 - JER | Age: 67
Discharge: HOME | End: 2022-04-01
Payer: COMMERCIAL

## 2022-04-01 VITALS — BODY MASS INDEX: 27.3 KG/M2

## 2022-04-01 VITALS — HEART RATE: 84 BPM | DIASTOLIC BLOOD PRESSURE: 63 MMHG | SYSTOLIC BLOOD PRESSURE: 151 MMHG | TEMPERATURE: 100.1 F

## 2022-04-01 DIAGNOSIS — J02.9: Primary | ICD-10-CM

## 2022-04-01 PROCEDURE — U0003 INFECTIOUS AGENT DETECTION BY NUCLEIC ACID (DNA OR RNA); SEVERE ACUTE RESPIRATORY SYNDROME CORONAVIRUS 2 (SARS-COV-2) (CORONAVIRUS DISEASE [COVID-19]), AMPLIFIED PROBE TECHNIQUE, MAKING USE OF HIGH THROUGHPUT TECHNOLOGIES AS DESCRIBED BY CMS-2020-01-R: HCPCS

## 2022-04-01 PROCEDURE — 3E033GC INTRODUCTION OF OTHER THERAPEUTIC SUBSTANCE INTO PERIPHERAL VEIN, PERCUTANEOUS APPROACH: ICD-10-PCS

## 2022-04-01 PROCEDURE — U0005 INFEC AGEN DETEC AMPLI PROBE: HCPCS

## 2023-03-13 ENCOUNTER — OFFICE (OUTPATIENT)
Dept: URBAN - METROPOLITAN AREA CLINIC 121 | Facility: CLINIC | Age: 68
Setting detail: OPHTHALMOLOGY
End: 2023-03-13
Payer: COMMERCIAL

## 2023-03-13 DIAGNOSIS — H25.13: ICD-10-CM

## 2023-03-13 DIAGNOSIS — E11.9: ICD-10-CM

## 2023-03-13 DIAGNOSIS — H40.013: ICD-10-CM

## 2023-03-13 DIAGNOSIS — H35.363: ICD-10-CM

## 2023-03-13 DIAGNOSIS — H43.391: ICD-10-CM

## 2023-03-13 PROCEDURE — 92014 COMPRE OPH EXAM EST PT 1/>: CPT | Performed by: OPHTHALMOLOGY

## 2023-03-13 PROCEDURE — 92134 CPTRZ OPH DX IMG PST SGM RTA: CPT | Performed by: OPHTHALMOLOGY

## 2023-03-13 ASSESSMENT — REFRACTION_MANIFEST
OD_SPHERE: +2.25
OD_AXIS: 180
OS_AXIS: 180
OD_CYLINDER: +1.00
OS_SPHERE: +2.50
OS_CYLINDER: +0.75

## 2023-03-13 ASSESSMENT — REFRACTION_AUTOREFRACTION
OS_AXIS: 175
OD_AXIS: 179
OD_SPHERE: 0.00
OD_CYLINDER: +2.25
OS_CYLINDER: +1.50
OS_SPHERE: 0.00

## 2023-03-13 ASSESSMENT — PACHYMETRY
OD_CT_UM: 510
OS_CT_CORRECTION: 1
OD_CT_CORRECTION: 2
OS_CT_UM: 533

## 2023-03-13 ASSESSMENT — KERATOMETRY
OS_K2POWER_DIOPTERS: 44.50
OD_K1POWER_DIOPTERS: 43.50
OS_K1POWER_DIOPTERS: 43.75
OD_K2POWER_DIOPTERS: 44.50
OD_AXISANGLE_DEGREES: 178
OS_AXISANGLE_DEGREES: 013
METHOD_AUTO_MANUAL: AUTO

## 2023-03-13 ASSESSMENT — TONOMETRY
OS_IOP_MMHG: 16
OD_IOP_MMHG: 16

## 2023-03-13 ASSESSMENT — REFRACTION_CURRENTRX
OD_OVR_VA: 20/
OS_VPRISM_DIRECTION: SV
OD_SPHERE: +2.25
OD_CYLINDER: +1.25
OD_VPRISM_DIRECTION: SV
OS_OVR_VA: 20/
OS_SPHERE: +2.50
OD_AXIS: 180
OS_AXIS: 009
OS_CYLINDER: +1.00

## 2023-03-13 ASSESSMENT — VISUAL ACUITY
OD_BCVA: 20/40
OS_BCVA: 20/50

## 2023-03-13 ASSESSMENT — SPHEQUIV_DERIVED
OD_SPHEQUIV: 1.125
OS_SPHEQUIV: 0.75
OS_SPHEQUIV: 2.875
OD_SPHEQUIV: 2.75

## 2023-03-13 ASSESSMENT — CONFRONTATIONAL VISUAL FIELD TEST (CVF)
OD_FINDINGS: FULL
OS_FINDINGS: FULL

## 2023-03-13 ASSESSMENT — AXIALLENGTH_DERIVED
OS_AL: 23.0814
OD_AL: 22.4002
OS_AL: 22.3153
OD_AL: 22.9859

## 2023-06-28 ENCOUNTER — APPOINTMENT (OUTPATIENT)
Dept: UROLOGY | Facility: CLINIC | Age: 68
End: 2023-06-28
Payer: COMMERCIAL

## 2023-06-28 DIAGNOSIS — N40.0 BENIGN PROSTATIC HYPERPLASIA WITHOUT LOWER URINARY TRACT SYMPMS: ICD-10-CM

## 2023-06-28 DIAGNOSIS — N43.40 SPERMATOCELE OF EPIDIDYMIS, UNSPECIFIED: ICD-10-CM

## 2023-06-28 PROCEDURE — 99213 OFFICE O/P EST LOW 20 MIN: CPT

## 2023-06-28 NOTE — HISTORY OF PRESENT ILLNESS
[FreeTextEntry1] : Patient referred for evalaution and management of issues following a prostate needle biopsy.\par He had an elevated PSA 5.8 and underwent a 12 core biopsy which was negative for cancer but did note some cores with chronic inflammation. He did have some hematuria post biopsy and @two weeks later went into retention. He never had a fever but was treated with 2 weeks keflex in addition to finasteride and flomax. At baseline denies any LUts but at present still having feeling of not emptying, frequency, nocturia times 2 and a slow stream with pushing. Some burning from time to time. he also notes loss of ejaculation.  no prior UTIs or retention. \par Had UTI - document by culture - treated and at time also had him stop the finasteride. Notes no difference in \par \par voiding on Flomax now versus baseline - does not no ejaculate.\par OK off medications - occasional nocturia but no other bothersome symptoms. Some reduction id ejaculate and some degree premature. No UTI symptoms. \par doing well - still of medication with no LUTs as seen below \par \par 6/23 noted a mass on the left testis starting earlier this year, enlarged to a marble. no tenderness.\par of Flomax - voiding OK. normal ejaculation \par PSA 3.3 6/23 \par \par \par

## 2023-06-28 NOTE — PHYSICAL EXAM
[General Appearance - Well Developed] : well developed [General Appearance - Well Nourished] : well nourished [Abdomen Soft] : soft [Abdomen Tenderness] : non-tender [Abdomen Mass (___ Cm)] : no abdominal mass palpated [Urethral Meatus] : meatus normal [Penis Abnormality] : normal circumcised penis [Urinary Bladder Findings] : the bladder was normal on palpation [Scrotum] : the scrotum was normal [Epididymis] : the epididymides were normal [Testes Tenderness] : no tenderness of the testes [Testes Mass (___cm)] : there were no testicular masses [FreeTextEntry1] : left spermatocele  [] : no rash [Exaggerated Use Of Accessory Muscles For Inspiration] : no accessory muscle use [Oriented To Time, Place, And Person] : oriented to person, place, and time [Normal Station and Gait] : the gait and station were normal for the patient's age [No Focal Deficits] : no focal deficits

## 2023-09-18 ENCOUNTER — OFFICE (OUTPATIENT)
Dept: URBAN - METROPOLITAN AREA CLINIC 121 | Facility: CLINIC | Age: 68
Setting detail: OPHTHALMOLOGY
End: 2023-09-18
Payer: COMMERCIAL

## 2023-09-18 DIAGNOSIS — H25.13: ICD-10-CM

## 2023-09-18 DIAGNOSIS — H43.391: ICD-10-CM

## 2023-09-18 DIAGNOSIS — H35.363: ICD-10-CM

## 2023-09-18 DIAGNOSIS — E11.9: ICD-10-CM

## 2023-09-18 DIAGNOSIS — H40.013: ICD-10-CM

## 2023-09-18 PROCEDURE — 92250 FUNDUS PHOTOGRAPHY W/I&R: CPT | Performed by: OPHTHALMOLOGY

## 2023-09-18 PROCEDURE — 99214 OFFICE O/P EST MOD 30 MIN: CPT | Performed by: OPHTHALMOLOGY

## 2023-09-18 ASSESSMENT — REFRACTION_MANIFEST
OD_AXIS: 180
OD_CYLINDER: +1.00
OD_SPHERE: +2.25
OS_AXIS: 180
OS_CYLINDER: +0.75
OS_SPHERE: +2.50

## 2023-09-18 ASSESSMENT — SPHEQUIV_DERIVED
OD_SPHEQUIV: 2.75
OD_SPHEQUIV: 0.75
OS_SPHEQUIV: 0.75
OS_SPHEQUIV: 2.875

## 2023-09-18 ASSESSMENT — AXIALLENGTH_DERIVED
OS_AL: 22.3977
OS_AL: 23.1697
OD_AL: 23.214
OD_AL: 22.4833

## 2023-09-18 ASSESSMENT — REFRACTION_AUTOREFRACTION
OD_AXIS: 005
OS_SPHERE: 0.00
OD_CYLINDER: +2.00
OD_SPHERE: -0.25
OS_AXIS: 179
OS_CYLINDER: +1.50

## 2023-09-18 ASSESSMENT — KERATOMETRY
METHOD_AUTO_MANUAL: AUTO
OD_K1POWER_DIOPTERS: 43.25
OS_K2POWER_DIOPTERS: 44.25
OD_K2POWER_DIOPTERS: 44.25
OD_AXISANGLE_DEGREES: 179
OS_AXISANGLE_DEGREES: 008
OS_K1POWER_DIOPTERS: 43.50

## 2023-09-18 ASSESSMENT — REFRACTION_CURRENTRX
OS_SPHERE: +2.50
OD_CYLINDER: +1.25
OS_SPHERE: +2.50
OS_VPRISM_DIRECTION: SV
OD_VPRISM_DIRECTION: SV
OD_SPHERE: +2.25
OD_CYLINDER: +0.75
OD_OVR_VA: 20/
OS_CYLINDER: +1.00
OS_OVR_VA: 20/
OS_CYLINDER: +0.75
OD_AXIS: 180
OD_OVR_VA: 20/
OS_AXIS: 002
OD_AXIS: 178
OD_SPHERE: +2.50
OS_OVR_VA: 20/
OS_AXIS: 009

## 2023-09-18 ASSESSMENT — CONFRONTATIONAL VISUAL FIELD TEST (CVF)
OD_FINDINGS: FULL
OS_FINDINGS: FULL

## 2023-09-18 ASSESSMENT — PACHYMETRY
OD_CT_CORRECTION: 2
OD_CT_UM: 510
OS_CT_UM: 533
OS_CT_CORRECTION: 1

## 2023-09-18 ASSESSMENT — VISUAL ACUITY
OD_BCVA: 20/25
OS_BCVA: 20/50

## 2023-09-18 ASSESSMENT — TONOMETRY
OS_IOP_MMHG: 14
OD_IOP_MMHG: 14

## 2024-03-20 ENCOUNTER — OFFICE (OUTPATIENT)
Dept: URBAN - METROPOLITAN AREA CLINIC 121 | Facility: CLINIC | Age: 69
Setting detail: OPHTHALMOLOGY
End: 2024-03-20
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
## 2024-03-20 PROCEDURE — NO SHOW FE NO SHOW FEE: Performed by: OPHTHALMOLOGY

## 2024-05-24 ENCOUNTER — OFFICE (OUTPATIENT)
Dept: URBAN - METROPOLITAN AREA CLINIC 121 | Facility: CLINIC | Age: 69
Setting detail: OPHTHALMOLOGY
End: 2024-05-24
Payer: COMMERCIAL

## 2024-05-24 DIAGNOSIS — H40.013: ICD-10-CM

## 2024-05-24 PROCEDURE — 92133 CPTRZD OPH DX IMG PST SGM ON: CPT | Performed by: OPHTHALMOLOGY

## 2024-05-24 PROCEDURE — 92083 EXTENDED VISUAL FIELD XM: CPT | Performed by: OPHTHALMOLOGY

## 2024-05-24 PROCEDURE — 99212 OFFICE O/P EST SF 10 MIN: CPT | Performed by: OPHTHALMOLOGY

## 2024-05-24 ASSESSMENT — CONFRONTATIONAL VISUAL FIELD TEST (CVF)
OD_FINDINGS: FULL
OS_FINDINGS: FULL

## 2024-10-18 ENCOUNTER — APPOINTMENT (OUTPATIENT)
Dept: UROLOGY | Facility: CLINIC | Age: 69
End: 2024-10-18

## 2024-10-25 ENCOUNTER — OFFICE (OUTPATIENT)
Facility: LOCATION | Age: 69
Setting detail: OPHTHALMOLOGY
End: 2024-10-25
Payer: COMMERCIAL

## 2024-10-25 DIAGNOSIS — E11.9: ICD-10-CM

## 2024-10-25 DIAGNOSIS — H25.13: ICD-10-CM

## 2024-10-25 DIAGNOSIS — H35.363: ICD-10-CM

## 2024-10-25 DIAGNOSIS — H40.013: ICD-10-CM

## 2024-10-25 DIAGNOSIS — H43.391: ICD-10-CM

## 2024-10-25 PROCEDURE — 99213 OFFICE O/P EST LOW 20 MIN: CPT | Performed by: OPHTHALMOLOGY

## 2024-10-25 PROCEDURE — 92132 CPTRZD OPH DX IMG ANT SGM: CPT | Performed by: OPHTHALMOLOGY

## 2024-10-25 ASSESSMENT — REFRACTION_CURRENTRX
OD_OVR_VA: 20/
OD_SPHERE: +2.50
OD_VPRISM_DIRECTION: SV
OS_OVR_VA: 20/
OS_OVR_VA: 20/
OD_AXIS: 180
OS_VPRISM_DIRECTION: SV
OS_AXIS: 009
OS_CYLINDER: +1.00
OD_OVR_VA: 20/
OS_AXIS: 002
OD_CYLINDER: +0.75
OD_CYLINDER: +1.25
OD_SPHERE: +2.25
OS_SPHERE: +2.50
OS_CYLINDER: +0.75
OD_AXIS: 178
OS_SPHERE: +2.50

## 2024-10-25 ASSESSMENT — REFRACTION_AUTOREFRACTION
OS_SPHERE: 0.00
OS_AXIS: 179
OD_SPHERE: -0.25
OD_AXIS: 005
OS_CYLINDER: +1.50
OD_CYLINDER: +2.00

## 2024-10-25 ASSESSMENT — PACHYMETRY
OD_CT_CORRECTION: 2
OS_CT_CORRECTION: 1
OD_CT_UM: 510
OS_CT_UM: 533

## 2024-10-25 ASSESSMENT — KERATOMETRY
OS_K2POWER_DIOPTERS: 44.25
OD_AXISANGLE_DEGREES: 179
OD_K1POWER_DIOPTERS: 43.25
OS_AXISANGLE_DEGREES: 008
OD_K2POWER_DIOPTERS: 44.25
METHOD_AUTO_MANUAL: AUTO
OS_K1POWER_DIOPTERS: 43.50

## 2024-10-25 ASSESSMENT — TONOMETRY
OS_IOP_MMHG: 15
OD_IOP_MMHG: 15

## 2024-10-25 ASSESSMENT — REFRACTION_MANIFEST
OD_CYLINDER: +1.00
OS_SPHERE: +2.50
OD_SPHERE: +2.25
OD_AXIS: 180
OS_AXIS: 180
OS_CYLINDER: +0.75

## 2024-10-25 ASSESSMENT — VISUAL ACUITY
OS_BCVA: 20/50
OD_BCVA: 20/50

## 2024-10-25 ASSESSMENT — CONFRONTATIONAL VISUAL FIELD TEST (CVF)
OD_FINDINGS: FULL
OS_FINDINGS: FULL

## 2024-11-18 ENCOUNTER — NON-APPOINTMENT (OUTPATIENT)
Age: 69
End: 2024-11-18

## 2024-12-10 ENCOUNTER — APPOINTMENT (OUTPATIENT)
Dept: UROLOGY | Facility: CLINIC | Age: 69
End: 2024-12-10
Payer: COMMERCIAL

## 2024-12-10 ENCOUNTER — NON-APPOINTMENT (OUTPATIENT)
Age: 69
End: 2024-12-10

## 2024-12-10 DIAGNOSIS — N43.40 SPERMATOCELE OF EPIDIDYMIS, UNSPECIFIED: ICD-10-CM

## 2024-12-10 DIAGNOSIS — N40.0 BENIGN PROSTATIC HYPERPLASIA WITHOUT LOWER URINARY TRACT SYMPMS: ICD-10-CM

## 2024-12-10 PROCEDURE — 99213 OFFICE O/P EST LOW 20 MIN: CPT

## 2024-12-11 LAB — PSA SERPL-MCNC: 4.4 NG/ML

## 2025-05-05 ENCOUNTER — OFFICE (OUTPATIENT)
Facility: LOCATION | Age: 70
Setting detail: OPHTHALMOLOGY
End: 2025-05-05
Payer: COMMERCIAL

## 2025-05-05 DIAGNOSIS — H25.13: ICD-10-CM

## 2025-05-05 DIAGNOSIS — H40.013: ICD-10-CM

## 2025-05-05 DIAGNOSIS — H43.391: ICD-10-CM

## 2025-05-05 DIAGNOSIS — H35.363: ICD-10-CM

## 2025-05-05 PROCEDURE — 99214 OFFICE O/P EST MOD 30 MIN: CPT | Performed by: OPHTHALMOLOGY

## 2025-05-05 PROCEDURE — 92250 FUNDUS PHOTOGRAPHY W/I&R: CPT | Performed by: OPHTHALMOLOGY

## 2025-05-05 ASSESSMENT — REFRACTION_MANIFEST
OS_SPHERE: +2.50
OS_AXIS: 180
OS_CYLINDER: +0.75
OD_CYLINDER: +1.00
OD_SPHERE: +2.25
OD_AXIS: 180

## 2025-05-05 ASSESSMENT — REFRACTION_CURRENTRX
OS_VPRISM_DIRECTION: SV
OD_AXIS: 178
OD_OVR_VA: 20/
OD_CYLINDER: +1.50
OS_OVR_VA: 20/
OD_OVR_VA: 20/
OD_OVR_VA: 20/
OS_AXIS: 009
OD_AXIS: 180
OS_CYLINDER: +0.75
OD_VPRISM_DIRECTION: SV
OS_AXIS: 002
OS_SPHERE: +2.50
OD_CYLINDER: +1.25
OS_OVR_VA: 20/
OS_AXIS: 011
OD_AXIS: 176
OD_CYLINDER: +0.75
OS_SPHERE: +2.50
OS_OVR_VA: 20/
OD_SPHERE: +2.25
OS_CYLINDER: +1.00
OD_SPHERE: +2.25
OD_SPHERE: +2.50
OS_SPHERE: +3.00
OS_CYLINDER: +0.75

## 2025-05-05 ASSESSMENT — REFRACTION_AUTOREFRACTION
OS_AXIS: 168
OD_CYLINDER: +1.25
OS_CYLINDER: +0.75
OD_AXIS: 001
OD_SPHERE: -0.25
OS_SPHERE: 0.00

## 2025-05-05 ASSESSMENT — KERATOMETRY
OS_AXISANGLE_DEGREES: 006
OD_K1POWER_DIOPTERS: 43.25
OD_K2POWER_DIOPTERS: 44.50
OS_K2POWER_DIOPTERS: 44.50
OD_AXISANGLE_DEGREES: 176
METHOD_AUTO_MANUAL: AUTO
OS_K1POWER_DIOPTERS: 43.75

## 2025-05-05 ASSESSMENT — CONFRONTATIONAL VISUAL FIELD TEST (CVF)
OD_FINDINGS: FULL
OS_FINDINGS: FULL

## 2025-05-05 ASSESSMENT — PACHYMETRY
OS_CT_UM: 533
OS_CT_CORRECTION: 1
OD_CT_CORRECTION: 2
OD_CT_UM: 510

## 2025-05-05 ASSESSMENT — VISUAL ACUITY
OS_BCVA: 20/40
OD_BCVA: 20/40

## 2025-05-05 ASSESSMENT — TONOMETRY
OD_IOP_MMHG: 12
OS_IOP_MMHG: 12

## 2025-06-29 ENCOUNTER — OUTPATIENT (OUTPATIENT)
Dept: OUTPATIENT SERVICES | Facility: HOSPITAL | Age: 70
LOS: 1 days | End: 2025-06-29
Payer: COMMERCIAL

## 2025-06-29 DIAGNOSIS — Z77.090 CONTACT WITH AND (SUSPECTED) EXPOSURE TO ASBESTOS: ICD-10-CM

## 2025-06-29 PROCEDURE — 71250 CT THORAX DX C-: CPT

## 2025-06-29 PROCEDURE — 71250 CT THORAX DX C-: CPT | Mod: 26

## 2025-07-03 ENCOUNTER — TRANSCRIPTION ENCOUNTER (OUTPATIENT)
Age: 70
End: 2025-07-03